# Patient Record
Sex: FEMALE | Race: OTHER | Employment: FULL TIME | URBAN - METROPOLITAN AREA
[De-identification: names, ages, dates, MRNs, and addresses within clinical notes are randomized per-mention and may not be internally consistent; named-entity substitution may affect disease eponyms.]

---

## 2024-08-28 ENCOUNTER — HOSPITAL ENCOUNTER (EMERGENCY)
Age: 34
Discharge: ANOTHER HOSPITAL | End: 2024-08-29
Attending: STUDENT IN AN ORGANIZED HEALTH CARE EDUCATION/TRAINING PROGRAM
Payer: COMMERCIAL

## 2024-08-28 DIAGNOSIS — O46.90 VAGINAL BLEEDING IN PREGNANCY: Primary | ICD-10-CM

## 2024-08-28 LAB
ALBUMIN SERPL-MCNC: 3.6 G/DL (ref 3.4–5)
ALBUMIN/GLOB SERPL: 1.2 {RATIO} (ref 1.1–2.2)
ALP SERPL-CCNC: 91 U/L (ref 40–129)
ALT SERPL-CCNC: 24 U/L (ref 10–40)
ANION GAP SERPL CALCULATED.3IONS-SCNC: 14 MMOL/L (ref 3–16)
AST SERPL-CCNC: 21 U/L (ref 15–37)
B-HCG SERPL EIA 3RD IS-ACNC: 3972 MIU/ML
BILIRUB SERPL-MCNC: <0.2 MG/DL (ref 0–1)
BUN SERPL-MCNC: 12 MG/DL (ref 7–20)
CALCIUM SERPL-MCNC: 8.8 MG/DL (ref 8.3–10.6)
CHLORIDE SERPL-SCNC: 105 MMOL/L (ref 99–110)
CO2 SERPL-SCNC: 19 MMOL/L (ref 21–32)
CREAT SERPL-MCNC: 0.6 MG/DL (ref 0.6–1.1)
DEPRECATED RDW RBC AUTO: 15.5 % (ref 12.4–15.4)
GFR SERPLBLD CREATININE-BSD FMLA CKD-EPI: >90 ML/MIN/{1.73_M2}
GLUCOSE SERPL-MCNC: 127 MG/DL (ref 70–99)
HCG SERPL QL: POSITIVE
HCT VFR BLD AUTO: 30.5 % (ref 36–48)
HGB BLD-MCNC: 10.3 G/DL (ref 12–16)
MCH RBC QN AUTO: 25.1 PG (ref 26–34)
MCHC RBC AUTO-ENTMCNC: 33.8 G/DL (ref 31–36)
MCV RBC AUTO: 74.4 FL (ref 80–100)
PLATELET # BLD AUTO: 471 K/UL (ref 135–450)
PMV BLD AUTO: 7.1 FL (ref 5–10.5)
POTASSIUM SERPL-SCNC: 3.6 MMOL/L (ref 3.5–5.1)
PROT SERPL-MCNC: 6.5 G/DL (ref 6.4–8.2)
RBC # BLD AUTO: 4.1 M/UL (ref 4–5.2)
SODIUM SERPL-SCNC: 138 MMOL/L (ref 136–145)
WBC # BLD AUTO: 9.7 K/UL (ref 4–11)

## 2024-08-28 PROCEDURE — 36415 COLL VENOUS BLD VENIPUNCTURE: CPT

## 2024-08-28 PROCEDURE — P9016 RBC LEUKOCYTES REDUCED: HCPCS

## 2024-08-28 PROCEDURE — 86901 BLOOD TYPING SEROLOGIC RH(D): CPT

## 2024-08-28 PROCEDURE — 86900 BLOOD TYPING SEROLOGIC ABO: CPT

## 2024-08-28 PROCEDURE — 86920 COMPATIBILITY TEST SPIN: CPT

## 2024-08-28 PROCEDURE — 84702 CHORIONIC GONADOTROPIN TEST: CPT

## 2024-08-28 PROCEDURE — 85027 COMPLETE CBC AUTOMATED: CPT

## 2024-08-28 PROCEDURE — 84703 CHORIONIC GONADOTROPIN ASSAY: CPT

## 2024-08-28 PROCEDURE — 80053 COMPREHEN METABOLIC PANEL: CPT

## 2024-08-28 PROCEDURE — 99285 EMERGENCY DEPT VISIT HI MDM: CPT

## 2024-08-28 PROCEDURE — 86850 RBC ANTIBODY SCREEN: CPT

## 2024-08-28 ASSESSMENT — PAIN DESCRIPTION - DESCRIPTORS: DESCRIPTORS: CRAMPING

## 2024-08-28 ASSESSMENT — PAIN SCALES - GENERAL: PAINLEVEL_OUTOF10: 7

## 2024-08-28 ASSESSMENT — PAIN DESCRIPTION - LOCATION: LOCATION: ABDOMEN

## 2024-08-29 VITALS
RESPIRATION RATE: 15 BRPM | WEIGHT: 184.75 LBS | TEMPERATURE: 98.6 F | SYSTOLIC BLOOD PRESSURE: 99 MMHG | HEART RATE: 89 BPM | OXYGEN SATURATION: 100 % | HEIGHT: 59 IN | DIASTOLIC BLOOD PRESSURE: 55 MMHG | BODY MASS INDEX: 37.24 KG/M2

## 2024-08-29 LAB
ABO + RH BLD: NORMAL
ABO + RH BLD: NORMAL
BLD GP AB SCN SERPL QL: NORMAL
BLOOD BANK DISPENSE STATUS: NORMAL
BLOOD BANK PRODUCT CODE: NORMAL
BPU ID: NORMAL
DESCRIPTION BLOOD BANK: NORMAL
EMERGENCY ISSUE BLOOD PRODUCTS SIGNED FORM: NORMAL

## 2024-08-29 PROCEDURE — 96374 THER/PROPH/DIAG INJ IV PUSH: CPT

## 2024-08-29 PROCEDURE — 6360000002 HC RX W HCPCS: Performed by: STUDENT IN AN ORGANIZED HEALTH CARE EDUCATION/TRAINING PROGRAM

## 2024-08-29 RX ORDER — ONDANSETRON 2 MG/ML
4 INJECTION INTRAMUSCULAR; INTRAVENOUS ONCE
Status: COMPLETED | OUTPATIENT
Start: 2024-08-29 | End: 2024-08-29

## 2024-08-29 RX ADMIN — ONDANSETRON 4 MG: 2 INJECTION INTRAMUSCULAR; INTRAVENOUS at 00:18

## 2024-08-29 NOTE — ED NOTES
[facility] called spoke with answering service, Dr. Swan will be returning the phone call for consult.

## 2024-08-29 NOTE — ED TRIAGE NOTES
Pt presents to ED for vaginal bleeding since 1530. States that she is 8 weeks pregnant and had an appt yesterday and they were unable to find the heartbeat. PT called OBGYN (Ann Cho) today and was told to come in if she was soaking more than 1 pad/hr.

## 2024-08-29 NOTE — ED PROVIDER NOTES
[facility]     EMERGENCY DEPARTMENT ENCOUNTER            Pt Name: Kristen Wright   MRN: [id number]   Birthdate 1990   Date of evaluation: 8/28/2024   Provider: Shaquille Ellis MD   PCP: Elvira Baxter MD   Note Started: 3:08 AM EDT 8/29/24          CHIEF COMPLAINT     Chief Complaint   Patient presents with    Vaginal Bleeding     8 weeks pregnant. Began around 1530 soaking more than 1 pad/hr             HISTORY OF PRESENT ILLNESS:   History from : Patient   Limitations to history : None     Kristen Wrgiht is a 34 y.o. female who presents with a chief complaint of vaginal bleeding.  She is between 6 and 8 weeks pregnant.  She showed me a result of an outpatient ultrasound at which time she was evaluated just 2 days ago.  She states that she had some spotting then and she was told to return to the emergency room if she ever had pads that were becoming soaked greater than 1 pad an hour.  She had several hours worth of clots prior to arrival.  Here she is also having bleeding.  She denies any significant pain.  She denies any lightheadedness, shortness of breath, fatigue.  Her only complaint is the bleeding.    Nursing Notes were all reviewed and agreed with, or any disagreements were addressed in the HPI.     REVIEW OF SYSTEMS :    Positives and Pertinent negatives as per HPI.      MEDICAL HISTORY   has no past medical history on file.    History reviewed. No pertinent surgical history.   CURRENTMEDICATIONS       Previous Medications    No medications on file      SCREENINGS                           CIWA Assessment  BP: (!) 99/55  Pulse: 89              PHYSICAL:  Physical Exam  Exam conducted with a chaperone present.   Constitutional:       Appearance: Normal appearance.   HENT:      Head: Normocephalic and atraumatic.      Right Ear: External ear normal.      Left Ear: External ear normal.      Nose: Nose normal.      Mouth/Throat:      Mouth: Mucous membranes are moist.   Eyes:  pressure dropped in the 60s.  I was called in the room and at that time she was earl.  She is mediated put in Trendelenburg.  A second large-bore IV was initiated and we gave the only unit of blood that we had here.  She had great improvement in her symptoms.  She was mentating well.  Color returned and her systolic blood pressure was in the 90s.  Heart rate was between the 60s and 80s.  Unclear why her heart rate was not going up consistent with hypovolemic shock.  Patient is not on any other medications.  We contemplated sending her to [facility] however the plan was to send her via 911 to get [facility] who had already been excepting.  They were here within minutes.  Patient was able to be transferred there successfully with our nursing staff going with blood still transfusing.  Blood was transfusing at 200 and an hour.  Patient tolerated well and emergency consent was obtained.  Patient was updated throughout the course of the evening and was agreeable with the plan of care.       Patient was given the following medications:   Medications   ondansetron (ZOFRAN) injection 4 mg (4 mg IntraVENous Given 8/29/24 0018)        CONSULTS:   None   Discussion with Other Professionals: Dr. Palacios OB[facility] at [facility], , emergency medicine       FINAL IMPRESSION    1. Vaginal bleeding in pregnancy           DISPOSITION/PLAN:   Transfer to [facility]  PATIENT REFERRED TO:   [address] [id number]    Go to   Dr. Stanton is the attending provider in the ED       DISCHARGE MEDICATIONS:   New Prescriptions    No medications on file        DISCONTINUED MEDICATIONS:   Discontinued Medications    No medications on file              (Please note that portions of this note were completed with a voice recognition program.  Efforts were made to edit the dictations but occasionally words are mis-transcribed.)       Shaquille Ellis MD (electronically signed)

## 2024-08-29 NOTE — ED NOTES
Unable to document blood transfusion in [facility] d/t blood bank still working on blood. Blood transfusion documented on paper with GARRETT Sharma as witness. Blood transfusion continued enroute to [facility] where handoff was given to GARRETT Perea upon transfer of pt. PT remained stable throughout transport.